# Patient Record
Sex: FEMALE | Race: WHITE | NOT HISPANIC OR LATINO
[De-identification: names, ages, dates, MRNs, and addresses within clinical notes are randomized per-mention and may not be internally consistent; named-entity substitution may affect disease eponyms.]

---

## 2024-01-16 ENCOUNTER — APPOINTMENT (OUTPATIENT)
Dept: VASCULAR SURGERY | Facility: CLINIC | Age: 64
End: 2024-01-16
Payer: COMMERCIAL

## 2024-01-16 VITALS
HEIGHT: 68 IN | HEART RATE: 71 BPM | OXYGEN SATURATION: 97 % | DIASTOLIC BLOOD PRESSURE: 76 MMHG | WEIGHT: 293 LBS | SYSTOLIC BLOOD PRESSURE: 147 MMHG | BODY MASS INDEX: 44.41 KG/M2

## 2024-01-16 DIAGNOSIS — Z86.39 PERSONAL HISTORY OF OTHER ENDOCRINE, NUTRITIONAL AND METABOLIC DISEASE: ICD-10-CM

## 2024-01-16 DIAGNOSIS — I82.491 ACUTE EMBOLISM AND THROMBOSIS OF OTHER SPECIFIED DEEP VEIN OF RIGHT LOWER EXTREMITY: ICD-10-CM

## 2024-01-16 DIAGNOSIS — I48.91 UNSPECIFIED ATRIAL FIBRILLATION: ICD-10-CM

## 2024-01-16 DIAGNOSIS — I48.92 UNSPECIFIED ATRIAL FIBRILLATION: ICD-10-CM

## 2024-01-16 DIAGNOSIS — I83.899 VARICOSE VEINS OF UNSPECIFIED LOWER EXTREMITY WITH OTHER COMPLICATIONS: ICD-10-CM

## 2024-01-16 PROCEDURE — 99204 OFFICE O/P NEW MOD 45 MIN: CPT

## 2024-01-16 PROCEDURE — 93970 EXTREMITY STUDY: CPT

## 2024-01-16 RX ORDER — MINOXIDIL 2.5 MG/1
2.5 TABLET ORAL
Refills: 0 | Status: ACTIVE | COMMUNITY

## 2024-01-16 RX ORDER — HYDROCHLOROTHIAZIDE 25 MG/1
25 TABLET ORAL DAILY
Refills: 0 | Status: ACTIVE | COMMUNITY

## 2024-01-16 RX ORDER — METOPROLOL SUCCINATE 25 MG/1
25 TABLET, EXTENDED RELEASE ORAL DAILY
Refills: 0 | Status: ACTIVE | COMMUNITY

## 2024-01-16 RX ORDER — FINASTERIDE 5 MG/1
5 TABLET, FILM COATED ORAL DAILY
Refills: 0 | Status: ACTIVE | COMMUNITY

## 2024-01-16 NOTE — REVIEW OF SYSTEMS
[Lower Ext Edema] : lower extremity edema [Itching] : itching [Negative] : Respiratory [de-identified] : hx of bleeding left LE vein + hyperpigmentation

## 2024-01-16 NOTE — PHYSICAL EXAM
[2+] : left 2+ [Ankle Swelling (On Exam)] : present [Ankle Swelling Bilaterally] : bilaterally  [] : bilaterally [Ankle Swelling On The Left] : moderate [de-identified] : alert and oriented in NAD, A+Ox3 [de-identified] : non labored respiration  [de-identified] : regular rate [de-identified] : b/l lipodermatosclerosis.  No active bleeding

## 2024-01-16 NOTE — REVIEW OF SYSTEMS
[Lower Ext Edema] : lower extremity edema [Itching] : itching [Negative] : Respiratory [de-identified] : hx of bleeding left LE vein + hyperpigmentation

## 2024-01-16 NOTE — ASSESSMENT
[FreeTextEntry1] : 62yo F with long standing hx of bilateral LE CVI with recent bleed from LLE varicosity.   Will evaluate candidacy for clinical trial HIFU for right lower extremity great saphenous vein.  Left leg ASV EVLT SAP UGS Left leg GSV Venaseal SAP UGS

## 2024-01-16 NOTE — ASSESSMENT
[FreeTextEntry1] : 64yo F with long standing hx of bilateral LE CVI with recent bleed from LLE varicosity.   Will evaluate candidacy for clinical trial HIFU for right lower extremity great saphenous vein.  Left leg ASV EVLT SAP UGS Left leg GSV Venaseal SAP UGS

## 2024-01-16 NOTE — HISTORY OF PRESENT ILLNESS
[FreeTextEntry1] : 62yo F with hx of hypothyroidism, a-fib on xarelto s/p ablation in May and long-standing b/l CVI with hx of recurrent bleeding varicosities.    She presents today after recent bleed from vein on left leg in November.  She was seen at  and underwent one session of sclerotherapy.  She has chronic edema and hyperpigmentation to both lower extremities. Reports hx of RLE DVT in 2020 after fall.  Denies hx of non-healing ulcerations.

## 2024-01-16 NOTE — PHYSICAL EXAM
[2+] : left 2+ [Ankle Swelling (On Exam)] : present [Ankle Swelling Bilaterally] : bilaterally  [] : bilaterally [Ankle Swelling On The Left] : moderate [de-identified] : alert and oriented in NAD, A+Ox3 [de-identified] : non labored respiration  [de-identified] : regular rate [de-identified] : b/l lipodermatosclerosis.  No active bleeding

## 2024-02-05 ENCOUNTER — APPOINTMENT (OUTPATIENT)
Dept: VASCULAR SURGERY | Facility: CLINIC | Age: 64
End: 2024-02-05

## 2024-02-05 ENCOUNTER — APPOINTMENT (OUTPATIENT)
Dept: WOUND CARE | Facility: HOSPITAL | Age: 64
End: 2024-02-05

## 2024-02-05 ENCOUNTER — APPOINTMENT (OUTPATIENT)
Dept: WOUND CARE | Facility: HOSPITAL | Age: 64
End: 2024-02-05
Payer: COMMERCIAL

## 2024-02-05 ENCOUNTER — OUTPATIENT (OUTPATIENT)
Dept: OUTPATIENT SERVICES | Facility: HOSPITAL | Age: 64
LOS: 1 days | End: 2024-02-05
Payer: COMMERCIAL

## 2024-02-05 DIAGNOSIS — I87.2 VENOUS INSUFFICIENCY (CHRONIC) (PERIPHERAL): ICD-10-CM

## 2024-02-05 PROCEDURE — 99205 OFFICE O/P NEW HI 60 MIN: CPT

## 2024-02-05 PROCEDURE — G0463: CPT

## 2024-02-06 PROBLEM — I87.2 CHRONIC VENOUS INSUFFICIENCY: Status: ACTIVE | Noted: 2024-01-16

## 2024-02-06 NOTE — ASSESSMENT
[FreeTextEntry1] : 64yo F with long standing hx of bilateral LE CVI with recent bleed from LLE varicosity.   Patient was found not to be a candidate after a venous duplex. Left leg ASV EVLT SAP UGS Left leg GSV Venaseal SAP UGS

## 2024-02-06 NOTE — HISTORY OF PRESENT ILLNESS
[FreeTextEntry1] : 64yo F with hx of hypothyroidism, a-fib on xarelto s/p ablation in May and long-standing b/l CVI with hx of recurrent bleeding varicosities.    She presents today after recent bleed from vein on left leg in November.  She was seen at  and underwent one session of sclerotherapy.  She has chronic edema and hyperpigmentation to both lower extremities. Reports hx of RLE DVT in 2020 after fall.  Denies hx of non-healing ulcerations.

## 2024-02-06 NOTE — REVIEW OF SYSTEMS
[Lower Ext Edema] : lower extremity edema [Itching] : itching [Negative] : Respiratory [de-identified] : hx of bleeding left LE vein + hyperpigmentation

## 2024-02-06 NOTE — PHYSICAL EXAM
[JVD] : no jugular venous distention  [2+] : left 2+ [Ankle Swelling (On Exam)] : present [Ankle Swelling Bilaterally] : bilaterally  [] : bilaterally [Ankle Swelling On The Left] : moderate [Abdomen Tenderness] : ~T ~M No abdominal tenderness [Skin Ulcer] : no ulcer [Alert] : alert [Oriented to Person] : oriented to person [Oriented to Place] : oriented to place [Oriented to Time] : oriented to time [Calm] : calm [de-identified] : alert and oriented in NAD, A+Ox3 [de-identified] : AT [de-identified] : supple [de-identified] : non labored respiration  [de-identified] : regular rate [de-identified] : soft [de-identified] : b/l lipodermatosclerosis.  No active bleeding

## 2024-02-07 ENCOUNTER — APPOINTMENT (OUTPATIENT)
Dept: VASCULAR SURGERY | Facility: CLINIC | Age: 64
End: 2024-02-07

## 2024-02-09 DIAGNOSIS — I87.2 VENOUS INSUFFICIENCY (CHRONIC) (PERIPHERAL): ICD-10-CM

## 2024-03-18 RX ORDER — SODIUM BICARBONATE 84 MG/ML
8.4 INJECTION, SOLUTION INTRAVENOUS
Qty: 5 | Refills: 0 | Status: ACTIVE | COMMUNITY
Start: 2024-03-18 | End: 1900-01-01

## 2024-03-18 RX ORDER — LIDOCAINE HYDROCHLORIDE 10 MG/ML
1 INJECTION, SOLUTION INFILTRATION; PERINEURAL
Qty: 50 | Refills: 0 | Status: ACTIVE | COMMUNITY
Start: 2024-03-18 | End: 1900-01-01

## 2024-03-20 ENCOUNTER — APPOINTMENT (OUTPATIENT)
Dept: VASCULAR SURGERY | Facility: CLINIC | Age: 64
End: 2024-03-20
Payer: COMMERCIAL

## 2024-03-20 VITALS — DIASTOLIC BLOOD PRESSURE: 70 MMHG | HEART RATE: 67 BPM | SYSTOLIC BLOOD PRESSURE: 116 MMHG | OXYGEN SATURATION: 96 %

## 2024-03-20 PROCEDURE — 36471 NJX SCLRSNT MLT INCMPTNT VN: CPT | Mod: RT

## 2024-03-20 PROCEDURE — 36475 ENDOVENOUS RF 1ST VEIN: CPT | Mod: RT

## 2024-03-20 NOTE — PROCEDURE
[FreeTextEntry2] : CVI [FreeTextEntry1] : R OMER RFA [FreeTextEntry3] :     Preoperative Diagnosis: Varicose veins, symptomatic, chronic venous insufficiency (I87.2).        Postoperative Diagnosis: Same      Attending: Carmen Chávez MD     Assistant: _       				     Procedure:  1.	Endovenous Radiofrequency Ablation of the R GSV (20995)  2. 	right Leg, Ultrasound guided sclerotherapy, multiple veins (62563)  Anesthesia: x Local/Tumescent (50cc 1% Lidocaine in 500cc NS) _ MAC  Complications: none  Cycles:_  4 Length of segment treated: 14 cm  Description: Chart was reviewed, including ultrasound report and operative plan. Consent was obtained from the patient, risks and benefits of the procedure were explained. Prior to the start of the procedure, the patient was examined in the standing position. The skin was marked to identify superficial varicosities.  The patient was then placed on the procedure table and the entire lower extremity was prepped and a sterile field using barriers was created.  Time out was performed.  1. Using ultrasound guidance a 19-guage needle was placed directly into the saphenous vein. The guide wire was then placed and the needle removed.  A 7F sheath for the RFA catheter was then introduced along the guide wire.  The RFA catheter was then introduced through the sheath.  The catheter tip position was then confirmed at approximately 2 cm from the junction.  The perivenous tissue of the saphenous vein was then anesthetized using tumescent anesthesia, 100 cc of 0.1% lidocaine solution. The catheter placement was again confirmed using ultrasound 2cm from the saphenous junction. Segmental ablation of the saphenous was performed with radiofrequency energy using 120 degrees Celsius for 20 sec each segment (each segment was treated twice). The catheter was removed and local pressure applied.  Ultrasound was again carried out, which demonstrated significant venospasm of the saphenous vein and no evidence of trauma to the deep veins.           2. The deeper varicosities in the right  leg were treated with ultrasound-guided sclerotherapy. Ultrasound was used to identify the extent and distribution of the deeper varicosities. Multiple varicosities were accessed with a 25G needle and injected with 1% STS. Compression of the veins using ultrasound was performed.  Patient was kept in Trendelenburg position for 10 minutes and instructed to perform dorsi/plantar flexion of the foot.    The incisions and injection sites were covered with 4x4 gauze and abdominal pads and the extremity was wrapped with kerlix and Coban using standard technique.  This was followed by application of a 20-30 mm Hg graduated venous compression hose.    The patient was given oral and written instructions for aftercare, which included frequent ambulation and leg elevation.  The patient was instructed to take NSAIDs.  The patient was scheduled for a follow-up visit in approximately 5-7 days with a venous duplex ultrasound of the treated leg to evaluate for success of procedure and evaluate for any deep vein thrombosis. The patient tolerated the procedure well and left the office in excellent condition ambulating without difficulty.

## 2024-03-29 ENCOUNTER — APPOINTMENT (OUTPATIENT)
Dept: VASCULAR SURGERY | Facility: CLINIC | Age: 64
End: 2024-03-29
Payer: COMMERCIAL

## 2024-03-29 VITALS
WEIGHT: 270 LBS | SYSTOLIC BLOOD PRESSURE: 117 MMHG | HEART RATE: 60 BPM | DIASTOLIC BLOOD PRESSURE: 79 MMHG | HEIGHT: 68 IN | BODY MASS INDEX: 40.92 KG/M2 | OXYGEN SATURATION: 95 %

## 2024-03-29 PROCEDURE — 99212 OFFICE O/P EST SF 10 MIN: CPT

## 2024-03-29 PROCEDURE — 93971 EXTREMITY STUDY: CPT

## 2024-03-30 ENCOUNTER — TRANSCRIPTION ENCOUNTER (OUTPATIENT)
Age: 64
End: 2024-03-30

## 2024-05-29 ENCOUNTER — APPOINTMENT (OUTPATIENT)
Dept: VASCULAR SURGERY | Facility: CLINIC | Age: 64
End: 2024-05-29
Payer: COMMERCIAL

## 2024-05-29 VITALS
OXYGEN SATURATION: 97 % | WEIGHT: 270 LBS | SYSTOLIC BLOOD PRESSURE: 116 MMHG | BODY MASS INDEX: 40.92 KG/M2 | DIASTOLIC BLOOD PRESSURE: 72 MMHG | HEIGHT: 68 IN | HEART RATE: 57 BPM

## 2024-05-29 PROCEDURE — 36478 ENDOVENOUS LASER 1ST VEIN: CPT | Mod: LT

## 2024-06-04 NOTE — REASON FOR VISIT
[TextEntry] : Pre-operative Diagnosis: Varicose veins, symptomatic, chronic venous insufficiency (I87.2).         Postoperative Diagnosis: Same       Attending: Carmen Chávez MD       Assistant: ALEXANDREA De Anda                                                                   Procedure:      1.  Endovenous Laser Ablation of the  Left ASV (09006)             Anesthesia: + Local/Tumescent (50cc 1% Lidocaine in 500cc NS)  Complications: none       Length of segment: 7 cm             Description: Chart was reviewed, including ultrasound report and operative plan. Consent was obtained from the patient, risks and benefits of the procedure were explained. Prior to the start of the procedure, the patient was examined in the standing position. The skin was marked to identify superficial varicosities.  The patient was then placed on the procedure table and the entire lower extremity was prepped and a sterile field using barriers was created.  Time out was performed.  1. Using ultrasound guidance a 19-guage needle was placed directly into the saphenous vein. The guide wire was then placed and the needle removed.  A 6F sheath for the laser catheter was then introduced along the guide wire.  The laser catheter was then introduced through the sheath.  The laser tip position was then confirmed at approximately 2 cm from the junction.  The perivenous tissue of the saphenous vein was then anesthetized using tumescent anesthesia, approximately 150 cc of 0.1% lidocaine solution.  The tip placement was again confirmed using ultrasound 2cm from the saphenous junction. The laser was fired at 12  lambert of continuous energy and the laser catheter was pulled back at a rate of 1 mm per second. The catheter was removed and local pressure applied.  Ultrasound was again carried out, which demonstrated significant venospasm of the saphenous vein and no evidence of trauma or thrombosis to the deep veins.  The incisions and injection sites were covered with 4x4 gauze and abdominal pads and the extremity was wrapped with kerlix and Coban using standard technique.  This was followed by application of a 20-30 mm Hg graduated venous compression hose.   The patient was given oral and written instructions for aftercare, which included frequent ambulation and leg elevation.  The patient was instructed to take NSAIDs.  The patient was scheduled for a follow-up visit in approximately 5-7 days with a venous duplex ultrasound of the treated leg to evaluate for success of procedure and evaluate for any deep vein thrombosis. The patient tolerated the procedure well and left the office in excellent condition ambulating without difficulty.

## 2024-06-07 ENCOUNTER — APPOINTMENT (OUTPATIENT)
Dept: VASCULAR SURGERY | Facility: CLINIC | Age: 64
End: 2024-06-07

## 2024-06-07 VITALS
BODY MASS INDEX: 38.04 KG/M2 | HEART RATE: 53 BPM | SYSTOLIC BLOOD PRESSURE: 107 MMHG | OXYGEN SATURATION: 98 % | HEIGHT: 68 IN | DIASTOLIC BLOOD PRESSURE: 73 MMHG | WEIGHT: 251 LBS

## 2024-06-07 PROCEDURE — 93971 EXTREMITY STUDY: CPT | Mod: LT

## 2024-06-07 RX ORDER — RIVAROXABAN 20 MG/1
20 TABLET, FILM COATED ORAL DAILY
Refills: 0 | Status: ACTIVE | COMMUNITY

## 2024-06-07 NOTE — DISCUSSION/SUMMARY
[FreeTextEntry1] : Doing well sp left asv tremayne  -continue compression prn  -f/u for Left GSV TREMAYNE and  left sap ugs

## 2024-06-07 NOTE — PHYSICAL EXAM
[2+] : left 2+ [Varicose Veins Of Lower Extremities] : present [Varicose Veins Of The Left Leg] : of the left leg [Ankle Swelling On The Left] : moderate [FreeTextEntry1] : LE warm, well perfused  no edema  mild ecchymosis at access site without hematoma  dilated patent lateral thigh/calf vv   VDX today:  ASV thrombosed, no evidence of DVT no deep reflux  GSV patent with reflux and communicating patent incompetent varicosities

## 2024-07-10 ENCOUNTER — APPOINTMENT (OUTPATIENT)
Dept: VASCULAR SURGERY | Facility: CLINIC | Age: 64
End: 2024-07-10
Payer: COMMERCIAL

## 2024-07-10 VITALS
BODY MASS INDEX: 37.59 KG/M2 | WEIGHT: 248 LBS | DIASTOLIC BLOOD PRESSURE: 90 MMHG | HEIGHT: 68 IN | HEART RATE: 77 BPM | OXYGEN SATURATION: 98 % | SYSTOLIC BLOOD PRESSURE: 125 MMHG

## 2024-07-10 VITALS — WEIGHT: 248 LBS | BODY MASS INDEX: 37.59 KG/M2 | HEIGHT: 68 IN

## 2024-07-10 PROCEDURE — 36482Z ENDOVEN THER CHEM ADHES 1ST: CUSTOM

## 2024-07-19 ENCOUNTER — APPOINTMENT (OUTPATIENT)
Dept: VASCULAR SURGERY | Facility: CLINIC | Age: 64
End: 2024-07-19
Payer: COMMERCIAL

## 2024-07-19 VITALS
HEIGHT: 68 IN | HEART RATE: 78 BPM | DIASTOLIC BLOOD PRESSURE: 71 MMHG | OXYGEN SATURATION: 97 % | SYSTOLIC BLOOD PRESSURE: 130 MMHG | BODY MASS INDEX: 37.59 KG/M2 | WEIGHT: 248 LBS

## 2024-07-19 PROCEDURE — 93971 EXTREMITY STUDY: CPT | Mod: LT

## 2024-10-09 ENCOUNTER — APPOINTMENT (OUTPATIENT)
Dept: VASCULAR SURGERY | Facility: CLINIC | Age: 64
End: 2024-10-09
Payer: COMMERCIAL

## 2024-10-09 VITALS — OXYGEN SATURATION: 98 % | HEART RATE: 57 BPM | SYSTOLIC BLOOD PRESSURE: 110 MMHG | DIASTOLIC BLOOD PRESSURE: 68 MMHG

## 2024-10-09 PROCEDURE — 37766 PHLEB VEINS - EXTREM 20+: CPT | Mod: LT

## 2024-10-09 PROCEDURE — 76942 ECHO GUIDE FOR BIOPSY: CPT

## 2024-10-09 PROCEDURE — 36471 NJX SCLRSNT MLT INCMPTNT VN: CPT | Mod: LT

## 2024-10-14 ENCOUNTER — APPOINTMENT (OUTPATIENT)
Dept: VASCULAR SURGERY | Facility: CLINIC | Age: 64
End: 2024-10-14

## 2024-10-14 PROCEDURE — 93971 EXTREMITY STUDY: CPT | Mod: LT

## 2024-10-21 ENCOUNTER — APPOINTMENT (OUTPATIENT)
Dept: VASCULAR SURGERY | Facility: CLINIC | Age: 64
End: 2024-10-21

## 2024-10-21 VITALS
HEIGHT: 68 IN | DIASTOLIC BLOOD PRESSURE: 65 MMHG | SYSTOLIC BLOOD PRESSURE: 118 MMHG | OXYGEN SATURATION: 99 % | HEART RATE: 62 BPM | BODY MASS INDEX: 37.59 KG/M2 | WEIGHT: 248 LBS

## 2024-10-21 DIAGNOSIS — I87.2 VENOUS INSUFFICIENCY (CHRONIC) (PERIPHERAL): ICD-10-CM

## 2024-10-21 DIAGNOSIS — L03.90 CELLULITIS, UNSPECIFIED: ICD-10-CM

## 2024-10-21 RX ORDER — CEPHALEXIN 500 MG/1
500 CAPSULE ORAL 4 TIMES DAILY
Qty: 40 | Refills: 0 | Status: ACTIVE | COMMUNITY
Start: 2024-10-21 | End: 1900-01-01

## 2024-10-24 ENCOUNTER — APPOINTMENT (OUTPATIENT)
Dept: VASCULAR SURGERY | Facility: CLINIC | Age: 64
End: 2024-10-24

## 2024-10-24 PROCEDURE — 99214 OFFICE O/P EST MOD 30 MIN: CPT

## 2024-11-22 ENCOUNTER — APPOINTMENT (OUTPATIENT)
Dept: VASCULAR SURGERY | Facility: CLINIC | Age: 64
End: 2024-11-22
Payer: COMMERCIAL

## 2024-11-22 PROCEDURE — 99213 OFFICE O/P EST LOW 20 MIN: CPT

## 2024-12-20 ENCOUNTER — APPOINTMENT (OUTPATIENT)
Dept: VASCULAR SURGERY | Facility: CLINIC | Age: 64
End: 2024-12-20
Payer: COMMERCIAL

## 2024-12-20 DIAGNOSIS — I87.2 VENOUS INSUFFICIENCY (CHRONIC) (PERIPHERAL): ICD-10-CM

## 2024-12-20 PROCEDURE — 99213 OFFICE O/P EST LOW 20 MIN: CPT

## 2024-12-20 PROCEDURE — 93971 EXTREMITY STUDY: CPT | Mod: LT
